# Patient Record
(demographics unavailable — no encounter records)

---

## 2025-03-28 NOTE — REVIEW OF SYSTEMS
[SOB] : shortness of breath [Dyspnea on exertion] : dyspnea during exertion [Chest Discomfort] : chest discomfort [Abdominal Pain] : abdominal pain [Change In The Stool] : change in stool [Joint Pain] : joint pain [Negative] : Psychiatric [Palpitations] : no palpitations [Blood in Stool] : no blood in stool

## 2025-03-28 NOTE — REASON FOR VISIT
[Symptom and Test Evaluation] : symptom and test evaluation [Other: ____] : [unfilled] [FreeTextEntry3] : Zo

## 2025-03-28 NOTE — ASSESSMENT
[FreeTextEntry1] : The patient has had no chest pain and her SOB has improved . She did have a small pericardial effusion in the past . She has no CAD on CTA in  . LDL had worsened and she has a borderline A1C . .   Discussed risk reduction and diet with patient l

## 2025-03-28 NOTE — HISTORY OF PRESENT ILLNESS
[FreeTextEntry1] : The patient has been feeling well. Intermittent SOB of uncertain etiology  . Cardiac and pulmonary work up were essentially normal .

## 2025-03-28 NOTE — CARDIOLOGY SUMMARY
[de-identified] : 05/062022 NSR IVCD 01/03/2022 EKG:sinus rhythm IVCD 3- NSR RSR' in V1-V3   11-8-2021 NSR RSR' in V1 and V2 .  3- NSR RSR' in V1 and V2  12- NJSR RSR' in V1-V3  [de-identified] : 11- Noraml LV systolic function mild MR mild TR Small pericardial effusion . [de-identified] : CTA 12/16/2021: CT angio heart coronary IV contrast: THe thoracic aorta has normal caliber, no evidence of plaque or stenosis in coronary arteriesNO pericardial effusion. Normal CAC. Left hepatic lobe cyst 3cm.

## 2025-04-11 NOTE — PHYSICAL EXAM
[MA] : MA [FreeTextEntry2] : BERENICE [Appropriately responsive] : appropriately responsive [Alert] : alert [No Acute Distress] : no acute distress [No Lymphadenopathy] : no lymphadenopathy [Regular Rate Rhythm] : regular rate rhythm [No Murmurs] : no murmurs [Clear to Auscultation B/L] : clear to auscultation bilaterally [Soft] : soft [Non-tender] : non-tender [Non-distended] : non-distended [No HSM] : No HSM [No Lesions] : no lesions [No Mass] : no mass [Oriented x3] : oriented x3 [Examination Of The Breasts] : a normal appearance [No Masses] : no breast masses were palpable [Labia Majora] : normal [Labia Minora] : normal [Normal] : normal [Uterine Adnexae] : normal
